# Patient Record
Sex: FEMALE | Race: BLACK OR AFRICAN AMERICAN | NOT HISPANIC OR LATINO | URBAN - METROPOLITAN AREA
[De-identification: names, ages, dates, MRNs, and addresses within clinical notes are randomized per-mention and may not be internally consistent; named-entity substitution may affect disease eponyms.]

---

## 2019-12-04 ENCOUNTER — EMERGENCY (EMERGENCY)
Facility: HOSPITAL | Age: 24
LOS: 0 days | Discharge: HOME | End: 2019-12-04
Attending: EMERGENCY MEDICINE | Admitting: EMERGENCY MEDICINE
Payer: OTHER MISCELLANEOUS

## 2019-12-04 VITALS
SYSTOLIC BLOOD PRESSURE: 103 MMHG | OXYGEN SATURATION: 100 % | DIASTOLIC BLOOD PRESSURE: 66 MMHG | RESPIRATION RATE: 18 BRPM | HEART RATE: 73 BPM

## 2019-12-04 VITALS
HEART RATE: 80 BPM | RESPIRATION RATE: 16 BRPM | SYSTOLIC BLOOD PRESSURE: 109 MMHG | OXYGEN SATURATION: 100 % | TEMPERATURE: 99 F | DIASTOLIC BLOOD PRESSURE: 60 MMHG

## 2019-12-04 DIAGNOSIS — O9A.211 INJURY, POISONING AND CERTAIN OTHER CONSEQUENCES OF EXTERNAL CAUSES COMPLICATING PREGNANCY, FIRST TRIMESTER: ICD-10-CM
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
## 2019-12-04 LAB
ALBUMIN SERPL ELPH-MCNC: 4.3 G/DL — SIGNIFICANT CHANGE UP (ref 3.5–5.2)
ALP SERPL-CCNC: 45 U/L — SIGNIFICANT CHANGE UP (ref 30–115)
ALT FLD-CCNC: 8 U/L — SIGNIFICANT CHANGE UP (ref 0–41)
ANION GAP SERPL CALC-SCNC: 14 MMOL/L — SIGNIFICANT CHANGE UP (ref 7–14)
ANISOCYTOSIS BLD QL: SLIGHT — SIGNIFICANT CHANGE UP
APPEARANCE UR: CLEAR — SIGNIFICANT CHANGE UP
APTT BLD: 30.3 SEC — SIGNIFICANT CHANGE UP (ref 27–39.2)
AST SERPL-CCNC: 19 U/L — SIGNIFICANT CHANGE UP (ref 0–41)
BASOPHILS # BLD AUTO: 0.08 K/UL — SIGNIFICANT CHANGE UP (ref 0–0.2)
BASOPHILS NFR BLD AUTO: 0.9 % — SIGNIFICANT CHANGE UP (ref 0–1)
BILIRUB SERPL-MCNC: 0.3 MG/DL — SIGNIFICANT CHANGE UP (ref 0.2–1.2)
BILIRUB UR-MCNC: NEGATIVE — SIGNIFICANT CHANGE UP
BLD GP AB SCN SERPL QL: SIGNIFICANT CHANGE UP
BUN SERPL-MCNC: 6 MG/DL — LOW (ref 10–20)
CALCIUM SERPL-MCNC: 9 MG/DL — SIGNIFICANT CHANGE UP (ref 8.5–10.1)
CHLORIDE SERPL-SCNC: 102 MMOL/L — SIGNIFICANT CHANGE UP (ref 98–110)
CO2 SERPL-SCNC: 20 MMOL/L — SIGNIFICANT CHANGE UP (ref 17–32)
COLOR SPEC: SIGNIFICANT CHANGE UP
CREAT SERPL-MCNC: 0.5 MG/DL — LOW (ref 0.7–1.5)
DIFF PNL FLD: NEGATIVE — SIGNIFICANT CHANGE UP
EOSINOPHIL # BLD AUTO: 0.08 K/UL — SIGNIFICANT CHANGE UP (ref 0–0.7)
EOSINOPHIL NFR BLD AUTO: 0.9 % — SIGNIFICANT CHANGE UP (ref 0–8)
ETHANOL SERPL-MCNC: <10 MG/DL — SIGNIFICANT CHANGE UP
GIANT PLATELETS BLD QL SMEAR: PRESENT — SIGNIFICANT CHANGE UP
GLUCOSE SERPL-MCNC: 81 MG/DL — SIGNIFICANT CHANGE UP (ref 70–99)
GLUCOSE UR QL: NEGATIVE — SIGNIFICANT CHANGE UP
HCT VFR BLD CALC: 30.7 % — LOW (ref 37–47)
HGB BLD-MCNC: 9.1 G/DL — LOW (ref 12–16)
HYPOCHROMIA BLD QL: SLIGHT — SIGNIFICANT CHANGE UP
INR BLD: 1.16 RATIO — SIGNIFICANT CHANGE UP (ref 0.65–1.3)
KETONES UR-MCNC: NEGATIVE — SIGNIFICANT CHANGE UP
LACTATE SERPL-SCNC: 1.1 MMOL/L — SIGNIFICANT CHANGE UP (ref 0.7–2)
LEUKOCYTE ESTERASE UR-ACNC: NEGATIVE — SIGNIFICANT CHANGE UP
LIDOCAIN IGE QN: 26 U/L — SIGNIFICANT CHANGE UP (ref 7–60)
LYMPHOCYTES # BLD AUTO: 1.91 K/UL — SIGNIFICANT CHANGE UP (ref 1.2–3.4)
LYMPHOCYTES # BLD AUTO: 22.8 % — SIGNIFICANT CHANGE UP (ref 20.5–51.1)
MANUAL SMEAR VERIFICATION: SIGNIFICANT CHANGE UP
MCHC RBC-ENTMCNC: 20.7 PG — LOW (ref 27–31)
MCHC RBC-ENTMCNC: 29.6 G/DL — LOW (ref 32–37)
MCV RBC AUTO: 69.8 FL — LOW (ref 81–99)
MICROCYTES BLD QL: SLIGHT — SIGNIFICANT CHANGE UP
MONOCYTES # BLD AUTO: 0.22 K/UL — SIGNIFICANT CHANGE UP (ref 0.1–0.6)
MONOCYTES NFR BLD AUTO: 2.6 % — SIGNIFICANT CHANGE UP (ref 1.7–9.3)
NEUTROPHILS # BLD AUTO: 6.02 K/UL — SIGNIFICANT CHANGE UP (ref 1.4–6.5)
NEUTROPHILS NFR BLD AUTO: 71.9 % — SIGNIFICANT CHANGE UP (ref 42.2–75.2)
NITRITE UR-MCNC: NEGATIVE — SIGNIFICANT CHANGE UP
OVALOCYTES BLD QL SMEAR: SLIGHT — SIGNIFICANT CHANGE UP
PH UR: 7.5 — SIGNIFICANT CHANGE UP (ref 5–8)
PLAT MORPH BLD: ABNORMAL
PLATELET # BLD AUTO: 222 K/UL — SIGNIFICANT CHANGE UP (ref 130–400)
POIKILOCYTOSIS BLD QL AUTO: SIGNIFICANT CHANGE UP
POLYCHROMASIA BLD QL SMEAR: SLIGHT — SIGNIFICANT CHANGE UP
POTASSIUM SERPL-MCNC: 4.1 MMOL/L — SIGNIFICANT CHANGE UP (ref 3.5–5)
POTASSIUM SERPL-SCNC: 4.1 MMOL/L — SIGNIFICANT CHANGE UP (ref 3.5–5)
PROT SERPL-MCNC: 7.2 G/DL — SIGNIFICANT CHANGE UP (ref 6–8)
PROT UR-MCNC: NEGATIVE — SIGNIFICANT CHANGE UP
PROTHROM AB SERPL-ACNC: 13.3 SEC — HIGH (ref 9.95–12.87)
RBC # BLD: 4.4 M/UL — SIGNIFICANT CHANGE UP (ref 4.2–5.4)
RBC # FLD: 19.7 % — HIGH (ref 11.5–14.5)
RBC BLD AUTO: ABNORMAL
SODIUM SERPL-SCNC: 136 MMOL/L — SIGNIFICANT CHANGE UP (ref 135–146)
SP GR SPEC: 1.01 — SIGNIFICANT CHANGE UP (ref 1.01–1.02)
UROBILINOGEN FLD QL: SIGNIFICANT CHANGE UP
VARIANT LYMPHS # BLD: 0.9 % — SIGNIFICANT CHANGE UP (ref 0–5)
WBC # BLD: 8.37 K/UL — SIGNIFICANT CHANGE UP (ref 4.8–10.8)
WBC # FLD AUTO: 8.37 K/UL — SIGNIFICANT CHANGE UP (ref 4.8–10.8)

## 2019-12-04 PROCEDURE — 99284 EMERGENCY DEPT VISIT MOD MDM: CPT

## 2019-12-04 PROCEDURE — 71045 X-RAY EXAM CHEST 1 VIEW: CPT | Mod: 26

## 2019-12-04 RX ORDER — SODIUM CHLORIDE 9 MG/ML
1000 INJECTION INTRAMUSCULAR; INTRAVENOUS; SUBCUTANEOUS ONCE
Refills: 0 | Status: COMPLETED | OUTPATIENT
Start: 2019-12-04 | End: 2019-12-04

## 2019-12-04 RX ADMIN — SODIUM CHLORIDE 2000 MILLILITER(S): 9 INJECTION INTRAMUSCULAR; INTRAVENOUS; SUBCUTANEOUS at 12:04

## 2019-12-04 NOTE — CONSULT NOTE ADULT - SUBJECTIVE AND OBJECTIVE BOX
TRAUMA ACTIVATION LEVEL:  II    MECHANISM OF INJURY:      [] Blunt  	[] MVC	[x] Fall	[] Pedestrian Struck	[] Motorcycle   [] Assault   [] Bicycle collision  [] Sports injury     [] Penetrating  	[] Gun Shot Wound 		[] Stab Wound    GCS: 	E: 4	V: 5	M: 6    24y old f s/p fall  HPI:  Patient is a 25 y/o F whose 3 month pregnant present s/p fall. 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 -HT, -LOC, -AC. She fell on her right side and used her arm to break the fall. In ED, she c/o right hip pain and right chest wall pain. She is from [place] and she is being followed by an OB/GYN physican from [place]    PAST MEDICAL & SURGICAL HISTORY:  No pertinent past medical history  No significant past surgical history      Allergies    No Known Allergies    Intolerances    Home Medications:      ROS: 10-system review is otherwise negative except HPI above.      Primary Survey:    A - airway intact  B - bilateral breath sounds and good chest rise  C - palpable pulses in all extremities  D - GCS 15 on arrival, WOODSON  Exposure obtained    Vital Signs Last 24 Hrs  T(C): 36.9 (04 Dec 2019 11:18), Max: 37.1 (04 Dec 2019 10:53)  T(F): 98.5 (04 Dec 2019 11:18), Max: 98.7 (04 Dec 2019 10:53)  HR: 79 (04 Dec 2019 11:33) (78 - 83)  BP: 112/62 (04 Dec 2019 11:33) (108/64 - 112/62)  BP(mean): --  RR: 18 (04 Dec 2019 11:33) (16 - 18)  SpO2: 100% (04 Dec 2019 11:33) (100% - 100%)    Secondary Survey:   General: NAD  HEENT: Normocephalic, atraumatic, EOMI, PEERLA. no scalp lacerations   Neck: Soft, midline trachea. no cspine tenderness  Chest: Right chest wall tenderness. or subq  emphysema   Cardiac: S1, S2, RRR  Respiratory: Bilateral breath sounds, clear and equal bilaterally  Abdomen: Soft, non-distended, non-tender, no rebound,   Groin: pelvis stable, right hip tenderness  Ext: palp radial b/l UE, b/l DP palp in Lower Extrem.   Back: no TTP, no palpable runoff/stepoff/deformity  Rectal: No alejandro blood     FAST:  Negative    Labs:  Pending      RADIOLOGY & ADDITIONAL STUDIES:  Pending    --------------------------------------------------------------------------------------- TRAUMA ACTIVATION LEVEL:  II    MECHANISM OF INJURY:      [] Blunt  	[] MVC	[x] Fall	[] Pedestrian Struck	[] Motorcycle   [] Assault   [] Bicycle collision  [] Sports injury     [] Penetrating  	[] Gun Shot Wound 		[] Stab Wound    GCS: 	E: 4	V: 5	M: 6    24y old f s/p fall  HPI:  Patient is a 25 y/o F whose 3 month pregnant present s/p fall. 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 -HT, -LOC, -AC. She fell on her right side and used her arm to break the fall. In ED, she c/o right hip pain and right chest wall pain. She is from [place] and she is being followed by an OB/GYN physican from [place]    PAST MEDICAL & SURGICAL HISTORY:  No pertinent past medical history  No significant past surgical history    Allergies  No Known Allergies  Intolerances    Home Medications: Not currently taking home medications    ROS: 10-system review is otherwise negative except HPI above.      Primary Survey:    A - airway intact  B - bilateral breath sounds and good chest rise  C - palpable pulses in all extremities  D - GCS 15 on arrival, WOODSON  Exposure obtained    Vital Signs Last 24 Hrs  T(C): 36.9 (04 Dec 2019 11:18), Max: 37.1 (04 Dec 2019 10:53)  T(F): 98.5 (04 Dec 2019 11:18), Max: 98.7 (04 Dec 2019 10:53)  HR: 79 (04 Dec 2019 11:33) (78 - 83)  BP: 112/62 (04 Dec 2019 11:33) (108/64 - 112/62)  RR: 18 (04 Dec 2019 11:33) (16 - 18)  SpO2: 100% (04 Dec 2019 11:33) (100% - 100%)    Secondary Survey:   General: NAD  HEENT: Normocephalic, atraumatic, EOMI, PEERLA. no scalp lacerations   Neck: Soft, midline trachea. no cspine tenderness  Chest: Right chest wall tenderness. or subq  emphysema   Cardiac: S1, S2, RRR  Respiratory: Bilateral breath sounds, clear and equal bilaterally  Abdomen: Soft, non-distended, non-tender, no rebound,   Groin: pelvis stable, right hip tenderness  Ext: palp radial b/l UE, b/l DP palp in Lower Extrem.   Back: no TTP, no palpable runoff/stepoff/deformity  Rectal: No alejandro blood     FAST:  Negative    Labs:  Pending...    RADIOLOGY & ADDITIONAL STUDIES:  Pending...    ---------------------------------------------------------------------------------------

## 2019-12-04 NOTE — ED PROVIDER NOTE - NS ED ROS FT
Constitutional:  See HPI.  Eyes:  No visual changes, eye pain or discharge.  ENMT:  No hearing changes, pain, discharge or infections. No neck pain or stiffness.  Cardiac:  No chest pain, SOB or edema. No chest pain with exertion.  Respiratory:  No cough or respiratory distress. No hemoptysis. No history of asthma or RAD.  GI:  No nausea, vomiting, diarrhea or abdominal pain.  :  No dysuria, frequency or burning.  MS:  No myalgia, muscle weakness, joint pain or back pain.  Neuro:  No headache or weakness.  No LOC.  Skin:  No skin rash.   Endocrine: No history of thyroid disease or diabetes.  Except as documented in the HPI,  all other systems are negative.

## 2019-12-04 NOTE — ED PROVIDER NOTE - CARE PLAN
Principal Discharge DX:	Fall down stairs, initial encounter  Secondary Diagnosis:	First trimester pregnancy

## 2019-12-04 NOTE — ED ADULT NURSE NOTE - OBJECTIVE STATEMENT
pt tripped and fell down 6 concrete steps. denies head trauma, LOC. no blood thinners. gcs 15  pt is 3 months pregnant, c/o lower abd pain

## 2019-12-04 NOTE — ED PROVIDER NOTE - NSFOLLOWUPINSTRUCTIONS_ED_ALL_ED_FT
You were seen in the ED for pain and pregnancy evaluation after fall down stairs.  Labs and xray looked good.  You can treat your pain with tylenol and hot packs/heating pad.  If you develop abdominal pain or nausea/vomiting, call your ob/gyn and return to the ED.    First Trimester of Pregnancy  The first trimester of pregnancy is from week 1 until the end of week 13 (months 1 through 3). A week after a sperm fertilizes an egg, the egg will implant on the wall of the uterus. This embryo will begin to develop into a baby. Genes from you and your partner will form the baby. The male genes will determine whether the baby will be a boy or a girl. At 6–8 weeks, the eyes and face will be formed, and the heartbeat can be seen on ultrasound. At the end of 12 weeks, all the baby's organs will be formed.  Now that you are pregnant, you will want to do everything you can to have a healthy baby. Two of the most important things are to get good prenatal care and to follow your health care provider's instructions. Prenatal care is all the medical care you receive before the baby's birth. This care will help prevent, find, and treat any problems during the pregnancy and childbirth.  Body changes during your first trimester  Your body goes through many changes during pregnancy. The changes vary from woman to woman.  You may gain or lose a couple of pounds at first.You may feel sick to your stomach (nauseous) and you may throw up (vomit). If the vomiting is uncontrollable, call your health care provider.You may tire easily.You may develop headaches that can be relieved by medicines. All medicines should be approved by your health care provider.You may urinate more often. Painful urination may mean you have a bladder infection.You may develop heartburn as a result of your pregnancy.You may develop constipation because certain hormones are causing the muscles that push stool through your intestines to slow down.You may develop hemorrhoids or swollen veins (varicose veins).Your breasts may begin to grow larger and become tender. Your nipples may stick out more, and the tissue that surrounds them (areola) may become darker.Your gums may bleed and may be sensitive to brushing and flossing.Dark spots or blotches (chloasma, mask of pregnancy) may develop on your face. This will likely fade after the baby is born.Your menstrual periods will stop.You may have a loss of appetite.You may develop cravings for certain kinds of food.You may have changes in your emotions from day to day, such as being excited to be pregnant or being concerned that something may go wrong with the pregnancy and baby.You may have more vivid and strange dreams.You may have changes in your hair. These can include thickening of your hair, rapid growth, and changes in texture. Some women also have hair loss during or after pregnancy, or hair that feels dry or thin. Your hair will most likely return to normal after your baby is born.What to expect at prenatal visits  During a routine prenatal visit:  You will be weighed to make sure you and the baby are growing normally.Your blood pressure will be taken.Your abdomen will be measured to track your baby's growth.The fetal heartbeat will be listened to between weeks 10 and 14 of your pregnancy.Test results from any previous visits will be discussed.Your health care provider may ask you:  How you are feeling.If you are feeling the baby move.If you have had any abnormal symptoms, such as leaking fluid, bleeding, severe headaches, or abdominal cramping.If you are using any tobacco products, including cigarettes, chewing tobacco, and electronic cigarettes.If you have any questions.Other tests that may be performed during your first trimester include:  Blood tests to find your blood type and to check for the presence of any previous infections. The tests will also be used to check for low iron levels (anemia) and protein on red blood cells (Rh antibodies). Depending on your risk factors, or if you previously had diabetes during pregnancy, you may have tests to check for high blood sugar that affects pregnant women (gestational diabetes).Urine tests to check for infections, diabetes, or protein in the urine.An ultrasound to confirm the proper growth and development of the baby.Fetal screens for spinal cord problems (spina bifida) and Down syndrome.HIV (human immunodeficiency virus) testing. Routine prenatal testing includes screening for HIV, unless you choose not to have this test.You may need other tests to make sure you and the baby are doing well.Follow these instructions at home:  Medicines     Follow your health care provider's instructions regarding medicine use. Specific medicines may be either safe or unsafe to take during pregnancy.Take a prenatal vitamin that contains at least 600 micrograms (mcg) of folic acid.If you develop constipation, try taking a stool softener if your health care provider approves.Eating and drinking        Eat a balanced diet that includes fresh fruits and vegetables, whole grains, good sources of protein such as meat, eggs, or tofu, and low-fat dairy. Your health care provider will help you determine the amount of weight gain that is right for you.Avoid raw meat and uncooked cheese. These carry germs that can cause birth defects in the baby.Eating four or five small meals rather than three large meals a day may help relieve nausea and vomiting. If you start to feel nauseous, eating a few soda crackers can be helpful. Drinking liquids between meals, instead of during meals, also seems to help ease nausea and vomiting.Limit foods that are high in fat and processed sugars, such as fried and sweet foods.To prevent constipation:  Eat foods that are high in fiber, such as fresh fruits and vegetables, whole grains, and beans.Drink enough fluid to keep your urine clear or pale yellow.Activity     Exercise only as directed by your health care provider. Most women can continue their usual exercise routine during pregnancy. Try to exercise for 30 minutes at least 5 days a week. Exercising will help you:  Control your weight.Stay in shape.Be prepared for labor and delivery.Experiencing pain or cramping in the lower abdomen or lower back is a good sign that you should stop exercising. Check with your health care provider before continuing with normal exercises.Try to avoid standing for long periods of time. Move your legs often if you must  one place for a long time.Avoid heavy lifting.Wear low-heeled shoes and practice good posture.You may continue to have sex unless your health care provider tells you not to.Relieving pain and discomfort     Wear a good support bra to relieve breast tenderness.Take warm sitz baths to soothe any pain or discomfort caused by hemorrhoids. Use hemorrhoid cream if your health care provider approves.Rest with your legs elevated if you have leg cramps or low back pain.If you develop varicose veins in your legs, wear support hose. Elevate your feet for 15 minutes, 3–4 times a day. Limit salt in your diet.Prenatal care     Schedule your prenatal visits by the twelfth week of pregnancy. They are usually scheduled monthly at first, then more often in the last 2 months before delivery.Write down your questions. Take them to your prenatal visits.Keep all your prenatal visits as told by your health care provider. This is important.Safety     Wear your seat belt at all times when driving.Make a list of emergency phone numbers, including numbers for family, friends, the hospital, and police and fire departments.General instructions     Ask your health care provider for a referral to a local prenatal education class. Begin classes no later than the beginning of month 6 of your pregnancy.Ask for help if you have counseling or nutritional needs during pregnancy. Your health care provider can offer advice or refer you to specialists for help with various needs.Do not use hot tubs, steam rooms, or saunas.Do not douche or use tampons or scented sanitary pads.Do not cross your legs for long periods of time.Avoid cat litter boxes and soil used by cats. These carry germs that can cause birth defects in the baby and possibly loss of the fetus by miscarriage or stillbirth.Avoid all smoking, herbs, alcohol, and medicines not prescribed by your health care provider. Chemicals in these products affect the formation and growth of the baby.Do not use any products that contain nicotine or tobacco, such as cigarettes and e-cigarettes. If you need help quitting, ask your health care provider. You may receive counseling support and other resources to help you quit.Schedule a dentist appointment. At home, brush your teeth with a soft toothbrush and be gentle when you floss.Contact a health care provider if:  You have dizziness.You have mild pelvic cramps, pelvic pressure, or nagging pain in the abdominal area.You have persistent nausea, vomiting, or diarrhea.You have a bad smelling vaginal discharge.You have pain when you urinate.You notice increased swelling in your face, hands, legs, or ankles.You are exposed to fifth disease or chickenpox.You are exposed to [place] measles (rubella) and have never had it.Get help right away if:  You have a fever.You are leaking fluid from your vagina.You have spotting or bleeding from your vagina.You have severe abdominal cramping or pain.You have rapid weight gain or loss.You vomit blood or material that looks like coffee grounds.You develop a severe headache.You have shortness of breath.You have any kind of trauma, such as from a fall or a car accident.Summary  The first trimester of pregnancy is from week 1 until the end of week 13 (months 1 through 3).Your body goes through many changes during pregnancy. The changes vary from woman to woman.You will have routine prenatal visits. During those visits, your health care provider will examine you, discuss any test results you may have, and talk with you about how you are feeling.This information is not intended to replace advice given to you by your health care provider. Make sure you discuss any questions you have with your health care provider.

## 2019-12-04 NOTE — ED ADULT NURSE NOTE - CHPI ED NUR SYMPTOMS NEG
no numbness/no loss of consciousness/no deformity/no vomiting/no confusion/no abrasion/no fever/no weakness/no bleeding/no tingling

## 2019-12-04 NOTE — CONSULT NOTE ADULT - SUBJECTIVE AND OBJECTIVE BOX
PGY 4 note    HPI: 23 yo P1 @ 13 weeks GA, EDC 6/10/20 by LMP c/w first trimester sonogram per patient presents to ED s/p mechanical fall @ 10 AM while working. Patient reports walking down stairs when she slipped and fell, landed on her right side, breaking her fall with her hands. Pt reports stairs were slippery. Denies LOC. No fever/chills, nausea/vomiting, diarrhea, dysuria, chest pain, SOB, palpitations, syncope, dizziness. No abd pain or vaginal bleeding before or after the fall. No difficulty ambulating. No complications during this pregnancy. Prenatal care is [facility] with Dr. Arndt. 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    PAST MEDICAL  chronic anemia-->blood transfusion a couple of months ago due to anemia, will start venofer injections throughout the pregnancy     SURGICAL HISTORY:  No significant past surgical history      OB/GYN HISTORY:   [facility]arch 12          Cycle Length  q month           Days Flow 5                                      Last Menstrual Period LMP 2019   x 1, FT, no complications                                         FAMILY HISTORY:  Mother-heart murmur, asymptomatic    SOCIAL HISTORY:  denies alcohol drug use smoking    Meds:  prenatal vitamins  Iron    REVIEW OF SYSTEMS  Denies the following: constitutional symptoms, visual symptoms, cardiovascular symptoms, respiratory symptoms, GI symptoms, musculoskeletal symptoms, skin symptoms, neurologic symptoms, hematologic symptoms, allergic symptoms, psychiatric symptoms  Except any pertinent positives listed.     PHYSICAL EXAM:  Vital Signs Last 24 Hrs  T(C): 36.9 (04 Dec 2019 11:18), Max: 37.1 (04 Dec 2019 10:53)  T(F): 98.5 (04 Dec 2019 11:18), Max: 98.7 (04 Dec 2019 10:53)  HR: 78 (04 Dec 2019 11:54) (78 - 84)  BP: 107/58 (04 Dec 2019 11:54) (103/66 - 115/-)  BP(mean): --  RR: 18 (04 Dec 2019 11:33) (16 - 18)  SpO2: 100% (04 Dec 2019 11:33) (100% - 100%)      GEN: NAD, AAO x 3  Heart: RRR  Lungs; CTAB  Abd: soft, nontender, nondistended, no r/g/r, no bruising  SVE:  cervix was closed/long/posterior, no CMT, no vaginal bleeding, physiologic discharge. Uterus was anteverted 12 weeks in size, nontender, no adnexal masses  Ext: no bruising, or ecchymosis      LABS:                        9.1    8.37  )-----------( 222      ( 04 Dec 2019 11:50 )             30.7         136  |  102  |  6<L>  ----------------------------<  81  4.1   |  20  |  0.5<L>    Ca    9.0      04 Dec 2019 11:50    TPro  7.2  /  Alb  4.3  /  TBili  0.3  /  DBili  x   /  AST  19  /  ALT  8   /  AlkPhos  45      I&O's Detail    PT/INR - ( 04 Dec 2019 11:50 )   PT: 13.30 sec;   INR: 1.16 ratio         PTT - ( 04 Dec 2019 11:50 )  PTT:30.3 sec  Urinalysis Basic - ( 04 Dec 2019 12:00 )    Color: Light Yellow / Appearance: Clear / S.011 / pH: x  Gluc: x / Ketone: Negative  / Bili: Negative / Urobili: <2 mg/dL   Blood: x / Protein: Negative / Nitrite: Negative   Leuk Esterase: Negative / RBC: x / WBC x   Sq Epi: x / Non Sq Epi: x / Bacteria: x    Fibrinogen Assay: 566.0 mg/dL (19 @ 11:50)    Bedside sonogram:  Performed by ED Attending Dr. Klerman   bpm        RADIOLOGY & ADDITIONAL STUDIES:  CXR done, not read  Pelvic Xray: done, not read

## 2019-12-04 NOTE — ED ADULT TRIAGE NOTE - CHIEF COMPLAINT QUOTE
S/P fall on 6  stairs on slipper stairs.  No LOC patient c/o of right buttock right abdomen and right back pain patient is 3 months pregnant.  Trauma Alert called in the ER

## 2019-12-04 NOTE — ED PROVIDER NOTE - PROGRESS NOTE DETAILS
FH = 155. Spontaneous fetal movement Patient seen by OB/GYN. Cleared for discharge from their perspective

## 2019-12-04 NOTE — CHART NOTE - NSCHARTNOTEFT_GEN_A_CORE
PGY 4 note    Upon review of chart, it was noted that patient had been discharged prior to getting rhogam.  Attempted to contact patient, but no answer, left voicemail for patient to get rhogam injection within 72 hours either in the emergency room or at her obgyn office in [place].  The black box physician in the emergency room and the charge nurse in the emergency room made aware of this and will also attempt to call the patient.

## 2019-12-04 NOTE — ED PROVIDER NOTE - PATIENT PORTAL LINK FT
You can access the FollowMyHealth Patient Portal offered by [facility] by registering at the following website: [email or web address]. By joining [employer]’s FollowMyHealth portal, you will also be able to view your health information using other applications (apps) compatible with our system.

## 2019-12-04 NOTE — CONSULT NOTE ADULT - ASSESSMENT
ASSESSMENT:  24y old f who is 3 month pregnant present s/p fall, -HT, -LOC, -AC. In ED, patient c/o right hip pain and right chest wall pain. 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 Presents GCS15, AAOx3, WOODSON. Complains of R sided chest and hip pain, mild lower abdominal pain. No external signs of traumatic injury.     PLAN:   Trauma Labs pending...  Trauma Imaging pending...  - CXR   - XR Pelvis   - FAST in ED confirms FHR, no free fluid    Additional studies:  Extremity films: no extremity films warranted, no bony tenderness or signs of extremity trauma on secondary survey    OB consultation given fall earlier today and mild lower abdominal pain, no tenderness on my examination in the abdomen    Disposition pending results of above labs and imaging  Will clear from trauma if above work-up (-) for acute traumatic injury    Above plan discussed with Trauma attending, Dr. Medrano, patient, and ED team  --------------------------------------------------------------------------------------  12-04-19 @ 12:02

## 2019-12-04 NOTE — ED PROVIDER NOTE - CLINICAL SUMMARY MEDICAL DECISION MAKING FREE TEXT BOX
24yF  at ~12wk p/w mechanical fall, c/o abd pain.  Labs and CXR reassuring.  Pt declined pelvic xray.  Bedside US w/ good FHR and abdominal FAST adequate and neg.  Pt cleared by trauma - ok to dc with supportive care, o/p ob/gyn f/u, careful return precautions.

## 2019-12-04 NOTE — ED PROVIDER NOTE - ATTENDING CONTRIBUTION TO CARE
24yF  ~12wk presents as trauma alert - fall down ~6 stairs after slipping in the snow.  No head trauma/LOC.  No open wounds.  C/o mild pain to chest and abd.  No VB, cramping/ctx, LoF.    ED team present on pt arrival, with trauma participating.    A: airway intact  B: b/l breath sounds  C: b/l pulses  D: GCS 15, PERRL, moving ext x 4  E: pt exposed, warm blankets applied, no gross deformities/wounds/injuries noted, +gravid abd    VSS  Secondary survey intact, notably GCS 15, no midline CTLS spine tenderness, Pelvis stable, + moving all extremities    FAST Negative and IUP confirmed    PMH denies  Meds PNV  All    CXR as per trauma  ob consult  Dispo per trauma team

## 2019-12-04 NOTE — CONSULT NOTE ADULT - ASSESSMENT
23 yo P1 @ 13 weeks GA, by LMP, s/p mechanical fall, +FH, no signs of placental abruption or miscarriage, hemodynamically and clinically stable    no acute intervention per gyn team  f/u type and screen, if RH negative please give rhogam  f/u trauma clearance and imaging  attend next prenatal appt  tylenol PRN  precautions given  disposition per ED    Will inform Dr. Hoover

## 2019-12-30 ENCOUNTER — OUTPATIENT (OUTPATIENT)
Dept: OUTPATIENT SERVICES | Facility: HOSPITAL | Age: 24
LOS: 1 days | Discharge: HOME | End: 2019-12-30
Payer: OTHER MISCELLANEOUS

## 2019-12-30 VITALS
RESPIRATION RATE: 18 BRPM | SYSTOLIC BLOOD PRESSURE: 106 MMHG | HEART RATE: 77 BPM | TEMPERATURE: 98 F | DIASTOLIC BLOOD PRESSURE: 56 MMHG

## 2019-12-30 VITALS — TEMPERATURE: 98 F
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
## 2019-12-30 DIAGNOSIS — O26.893 OTHER SPECIFIED PREGNANCY RELATED CONDITIONS, THIRD TRIMESTER: ICD-10-CM
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
## 2019-12-30 DIAGNOSIS — O26.892 OTHER SPECIFIED PREGNANCY RELATED CONDITIONS, SECOND TRIMESTER: ICD-10-CM

## 2019-12-30 PROBLEM — Z78.9 OTHER SPECIFIED HEALTH STATUS: Chronic | Status: ACTIVE | Noted: 2019-12-04

## 2019-12-30 LAB
ALLERGY+IMMUNOLOGY DIAG STUDY NOTE: SIGNIFICANT CHANGE UP
APTT BLD: 30.8 SEC — SIGNIFICANT CHANGE UP (ref 27–39.2)
BASOPHILS # BLD AUTO: 0.03 K/UL — SIGNIFICANT CHANGE UP (ref 0–0.2)
BASOPHILS NFR BLD AUTO: 0.3 % — SIGNIFICANT CHANGE UP (ref 0–1)
BLD GP AB SCN SERPL QL: SIGNIFICANT CHANGE UP
DIR ANTIGLOB POLYSPECIFIC INTERPRETATION: SIGNIFICANT CHANGE UP
EOSINOPHIL # BLD AUTO: 0.14 K/UL — SIGNIFICANT CHANGE UP (ref 0–0.7)
EOSINOPHIL NFR BLD AUTO: 1.4 % — SIGNIFICANT CHANGE UP (ref 0–8)
FIBRINOGEN PPP-MCNC: 554 MG/DL — SIGNIFICANT CHANGE UP (ref 204.4–570.6)
HCT VFR BLD CALC: 30.1 % — LOW (ref 37–47)
HGB BLD-MCNC: 8.9 G/DL — LOW (ref 12–16)
IMM GRANULOCYTES NFR BLD AUTO: 0.3 % — SIGNIFICANT CHANGE UP (ref 0.1–0.3)
INR BLD: 1.09 RATIO — SIGNIFICANT CHANGE UP (ref 0.65–1.3)
LYMPHOCYTES # BLD AUTO: 2.56 K/UL — SIGNIFICANT CHANGE UP (ref 1.2–3.4)
LYMPHOCYTES # BLD AUTO: 25 % — SIGNIFICANT CHANGE UP (ref 20.5–51.1)
MCHC RBC-ENTMCNC: 20.6 PG — LOW (ref 27–31)
MCHC RBC-ENTMCNC: 29.6 G/DL — LOW (ref 32–37)
MCV RBC AUTO: 69.8 FL — LOW (ref 81–99)
MONOCYTES # BLD AUTO: 0.63 K/UL — HIGH (ref 0.1–0.6)
MONOCYTES NFR BLD AUTO: 6.1 % — SIGNIFICANT CHANGE UP (ref 1.7–9.3)
NEUTROPHILS # BLD AUTO: 6.87 K/UL — HIGH (ref 1.4–6.5)
NEUTROPHILS NFR BLD AUTO: 66.9 % — SIGNIFICANT CHANGE UP (ref 42.2–75.2)
NRBC # BLD: 0 /100 WBCS — SIGNIFICANT CHANGE UP (ref 0–0)
PLATELET # BLD AUTO: 209 K/UL — SIGNIFICANT CHANGE UP (ref 130–400)
PROTHROM AB SERPL-ACNC: 12.5 SEC — SIGNIFICANT CHANGE UP (ref 9.95–12.87)
RBC # BLD: 4.31 M/UL — SIGNIFICANT CHANGE UP (ref 4.2–5.4)
RBC # FLD: 18.3 % — HIGH (ref 11.5–14.5)
WBC # BLD: 10.26 K/UL — SIGNIFICANT CHANGE UP (ref 4.8–10.8)
WBC # FLD AUTO: 10.26 K/UL — SIGNIFICANT CHANGE UP (ref 4.8–10.8)

## 2019-12-30 PROCEDURE — 99283 EMERGENCY DEPT VISIT LOW MDM: CPT | Mod: 25

## 2019-12-30 PROCEDURE — 76815 OB US LIMITED FETUS(S): CPT | Mod: 26

## 2019-12-30 NOTE — OB PROVIDER TRIAGE NOTE - NSOBPROVIDERNOTE_OBGYN_ALL_OB_FT
A/P: 23yo  at 16w5d, Rh neg, s/p mechanical fall, no signs of placental abruption, not in  labor, reassuring maternal and fetal status A/P: 23yo  at 16w5d, Rh neg, s/p mechanical fall, no signs of placental abruption, not in  labor, reassuring maternal and fetal status  - dicharge home  -  labor precautions  - PO hydration  - follow up with your OBGYN at next scheduled appointment    Dr. Lema and Dr. Ga aware. A/P: 25yo  at 16w5d, Rh neg, s/p mechanical fall, no signs of placental abruption, not in  labor, reassuring maternal and fetal status  - dicharge home  -  labor precautions  - PO hydration  - Rhogam today  - follow up with your OBGYN at next scheduled appointment    Dr. Lema and Dr. Ga aware. A/P: 25yo  at 16w5d, Rh neg, s/p mechanical fall, no signs of placental abruption, not in  labor, reassuring maternal and fetal status  - dicharge home  -  labor precautions  - PO hydration  - Rhogam today  - follow up with your OBGYN at next scheduled appointment    Dr. Lema and Dr. Ga aware.    Attending: Attestation signed  pt met  case reviewed  precautions given  rhogam ordered    sono +FH

## 2019-12-30 NOTE — OB PROVIDER TRIAGE NOTE - NSHPLABSRESULTS_GEN_ALL_CORE
12/4/19  8.37>9.1/30.7<222  coags 13.30/1.16/30.3  fibrinogen 566  O neg 12/4/19  8.37>9.1/30.7<222  coags 13.30/1.16/30.3  fibrinogen 566  O neg    12/30  10.26> 8.9/30.1<209  coags 12.50/1.09/30.8  fibrinogen 554

## 2019-12-30 NOTE — OB PROVIDER TRIAGE NOTE - NS_OBGYNHISTORY_OBGYN_ALL_OB_FT
ObHx: FT NSVDx1, male, [id number], no complications    GynHx: Denies hx of fibroids, ovarian cysts, abnormal paps, or STDs

## 2019-12-30 NOTE — OB PROVIDER TRIAGE NOTE - NSHPPHYSICALEXAM_GEN_ALL_CORE
Vital Signs Last 24 Hrs  T(F): 98 (30 Dec 2019 15:05), Max: 98 (30 Dec 2019 15:01)  HR: 77 (30 Dec 2019 15:05) (77 - 77)  BP: 106/56 (30 Dec 2019 15:05) (106/56 - 106/56)  RR: 18 (30 Dec 2019 15:05) (18 - 18)    Gen: AOx3, no acute distress  CVS: RRR  Lungs: CTABL  Abd: soft, gravid, no palpable contractions, no abdominal tenderness to palpation  Ext: No edema bilaterally    EFM: +FH  Speculum: normal amount of physiological discharge, no active bleeding, no lesions, cervix appears closed, nitrazine neg, ferning neg  TVUS: CL 3.63cm  SVE: C/L/P  BSS: breech, MVP 4.55cm, FH 150bpm, EFW 174g (6oz), no signs of placental abruption Vital Signs Last 24 Hrs  T(F): 98 (30 Dec 2019 15:05), Max: 98 (30 Dec 2019 15:01)  HR: 77 (30 Dec 2019 15:05) (77 - 77)  BP: 106/56 (30 Dec 2019 15:05) (106/56 - 106/56)  RR: 18 (30 Dec 2019 15:05) (18 - 18)    Gen: AOx3, no acute distress  CVS: RRR  Lungs: CTABL  Abd: soft, gravid, no palpable contractions, no abdominal tenderness to palpation  Extt: No edema bilaterally, neg homans  skin: nl    EFM: +FH  Speculum: normal amount of physiological discharge, no active bleeding, no lesions, cervix appears closed, nitrazine neg, ferning neg  TVUS: CL 3.63cm  SVE: C/L/P  BSS: breech, MVP 4.55cm, FH 150bpm, EFW 174g (6oz), no signs of placental abruption

## 2019-12-30 NOTE — OB PROVIDER TRIAGE NOTE - HISTORY OF PRESENT ILLNESS
23yo  at 16w5d EDC 6/10/2020 by LMP c/w first trimester sono presents after falling down three steps at 1pm today. 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 She denies any head or abdominal trauma, or loss of consciousness. She reports mild, intermittent, aching upper abdominal pain since the fall, and reports a small leak of watery fluid from the vagina immediately after the fall. Denies any fever, chills, CP, SOB, abdominal cramping, vaginal bleeding, dysuria, urinary urgency or frequency. Follow with Dr. Arndt in [place]. Patient was seen in the ED on  after a mechanical fall, abruption and  labor was ruled out, pt was sent home, and received Rhogam from her PMD. She reports that on  she had an episode of syncope due to dehydration and was seen at [facility]. She was found to have a UTI at that time, and is currently on cephalexin day 5/10. Last PO intake this morning, last BM yesterday, last intercourse was last month. 23yo  at 16w5d EDC 6/10/2020 by LMP c/w first trimester sondangelo presents after falling down three steps at 1pm today. 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 She denies any head or abdominal trauma, or loss of consciousness. She denies any presyncopal symptoms, dizziness, lightheadedness, or palpitations. She reports mild, intermittent, aching upper abdominal pain since the fall, and reports a small leak of watery fluid from the vagina immediately after the fall. Denies any fever, chills, CP, SOB, abdominal cramping, vaginal bleeding, dysuria, urinary urgency or frequency. Follow with Dr. Arndt in [place]. Has anemia, takes PO iron. Patient was seen in the ED on  after a mechanical fall, abruption and  labor was ruled out, pt was sent home, and received Rhogam from her PMD on . She reports that on  she had an episode of syncope due to dehydration and was seen at [facility]. She was found to have a UTI at that time, and is currently on cephalexin day 5/10. Last PO intake this morning, last BM yesterday, last intercourse was last month.

## 2019-12-30 NOTE — OB PROVIDER TRIAGE NOTE - ADDITIONAL INSTRUCTIONS
Follow up with your OBGYN at next scheduled appointment. If you have any vaginal bleeding, LOF, or abdominal pain, call your doctor, or return to labor and delivery.

## 2020-01-01 PROCEDURE — 86077 PHYS BLOOD BANK SERV XMATCH: CPT

## 2023-05-11 NOTE — ED PROVIDER NOTE - NS ED ATTENDING STATEMENT MOD
May 15, 2023      Karrie Reynaga       [id number] [place] [address]            Dear Karrie,    There’s no question about it - preventive testing can save lives or can help stop a disease process in its tracks. Many health problems start out silently without symptoms. Testing is often the only way to catch these problems in early stages, when they can be more successfully treated.    Our records show that you are due for the following preventive tests(s). If you have had this testing done, please call us so we can update your record.    Colonoscopy: Colorectal cancer usually comes from polyps (abnormal growths) in the colon or rectum. Colonoscopy can find the polyps and remove them before they turn to cancer. To schedule your Colonoscopy, please call [phone]            Sincerely,         Julius Dykes MD   [id number] [place] [address] [phone]         [facility] offers online access to your health information via [email or web address] .   By registering for [employer] you will be able to view test results, pay your bill, send messages to your care team (not for immediate response), refill prescriptions, schedule and verify appointments.       I have personally performed a face to face diagnostic evaluation on this patient. I have reviewed the ACP note and agree with the history, exam and plan of care, except as noted.